# Patient Record
Sex: FEMALE | Race: WHITE | NOT HISPANIC OR LATINO | Employment: UNEMPLOYED | URBAN - NONMETROPOLITAN AREA
[De-identification: names, ages, dates, MRNs, and addresses within clinical notes are randomized per-mention and may not be internally consistent; named-entity substitution may affect disease eponyms.]

---

## 2021-04-30 ENCOUNTER — OFFICE VISIT (OUTPATIENT)
Dept: OTOLARYNGOLOGY | Facility: CLINIC | Age: 21
End: 2021-04-30

## 2021-04-30 VITALS
WEIGHT: 159.7 LBS | BODY MASS INDEX: 33.52 KG/M2 | DIASTOLIC BLOOD PRESSURE: 74 MMHG | SYSTOLIC BLOOD PRESSURE: 112 MMHG | TEMPERATURE: 97.3 F | HEIGHT: 58 IN | HEART RATE: 97 BPM

## 2021-04-30 DIAGNOSIS — H93.19 TINNITUS, UNSPECIFIED LATERALITY: ICD-10-CM

## 2021-04-30 DIAGNOSIS — R42 DISEQUILIBRIUM: ICD-10-CM

## 2021-04-30 DIAGNOSIS — H91.90 HEARING LOSS, UNSPECIFIED HEARING LOSS TYPE, UNSPECIFIED LATERALITY: Primary | ICD-10-CM

## 2021-04-30 PROCEDURE — 99203 OFFICE O/P NEW LOW 30 MIN: CPT | Performed by: OTOLARYNGOLOGY

## 2021-04-30 RX ORDER — BUSPIRONE HYDROCHLORIDE 7.5 MG/1
TABLET ORAL
COMMUNITY
Start: 2021-04-16

## 2021-04-30 RX ORDER — ONDANSETRON 4 MG/1
TABLET, FILM COATED ORAL
COMMUNITY
Start: 2021-04-16

## 2021-04-30 RX ORDER — TIZANIDINE 4 MG/1
TABLET ORAL
COMMUNITY
Start: 2021-04-21

## 2021-04-30 RX ORDER — HYDROXYZINE HYDROCHLORIDE 25 MG/1
TABLET, FILM COATED ORAL
COMMUNITY
Start: 2021-04-16

## 2021-04-30 RX ORDER — TRAZODONE HYDROCHLORIDE 50 MG/1
TABLET ORAL
COMMUNITY
Start: 2021-04-16

## 2021-04-30 NOTE — PROGRESS NOTES
ENT Office Consult Note     Date of Consult: 2021     Patient Name: Lisandra Kennedy  MRN: [id number]   : 2000     Referring Provider: Selena Norris A*    Care Team: Patient Care Team:  Selena Norris APRN as PCP - General (Nurse Practitioner)  Geovanny Nixon MD as PCP - Family Medicine     Chief Complaint:    Chief Complaint   Patient presents with   • Consult     New Patient is here today complaining of hearing problems.        History of Present Illness: Lisandra Kennedy is a 20 y.o. female who presents today for evaluation of ongoing hearing loss that developed in her early teenage years.  She does not recall when her last audiogram was performed.  She has no prior loud noise exposure and has had no recent bouts of acute otitis or otorrhea.  She also has tinnitus in both ears greater in the left which has been present for several years.  She also has a several year history of disequilibrium and states that she randomly loses her balance.  She does not describe any whirling vertigo.    Subjective      Review of Systems:   Review of Systems   HENT: Positive for ear pain and hearing loss.    Neurological: Positive for dizziness.   Psychiatric/Behavioral: The patient is nervous/anxious.       I have reviewed and confirmed the accuracy of the ROS as documented by the MA/LPN/RN Mateusz Mckeon MD     Pertinent items are noted in HPI.     Past Medical History:   Past Medical History:   Diagnosis Date   • Back problem    • Hearing loss    • Muscle spasm        Past Surgical History: History reviewed. No pertinent surgical history.    Family History: History reviewed. No pertinent family history.    Social History:   Social History     Socioeconomic History   • Marital status: Single     Spouse name: Not on file   • Number of children: Not on file   • Years of education: Not on file   • Highest education level: Not on file   Substance and Sexual Activity   • Alcohol use:  "Yes       Medications:     Current Outpatient Medications:   •  busPIRone (BUSPAR) 7.5 MG tablet, , Disp: , Rfl:   •  hydrOXYzine (ATARAX) 25 MG tablet, , Disp: , Rfl:   •  ondansetron (ZOFRAN) 4 MG tablet, , Disp: , Rfl:   •  tiZANidine (ZANAFLEX) 4 MG tablet, , Disp: , Rfl:   •  traZODone (DESYREL) 50 MG tablet, , Disp: , Rfl:     Allergies:   Allergies   Allergen Reactions   • Drug Ingredient [Kiwi Extract] Rash   • Latex Rash   • Pineapple Rash       Objective     Physical Exam:  Vital Signs:   Vitals:    04/30/21 1406   BP: 112/74   Pulse: 97   Temp: 97.3 °F (36.3 °C)   Weight: 72.4 kg (159 lb 11.2 oz)   Height: 147.3 cm (58\")     Body mass index is 33.38 kg/m².     General Appearance:  Alert, cooperative, in no acute distress   Head:  Normocephalic, without obvious abnormality, atraumatic   Eyes:          Conjunctivae and sclerae normal, PERRLA   Ears:   Tympanic membranes both intact and healthy in appearance middle ear spaces dry   Nose:  Relatively straight septum with pink dry mucosa   Throat:  Pink pharyngeal mucosa with size 2 tonsils   Fiberoptic Exam:  Deferred   Neck:  No significant cervical adenopathy or salivary masses   Lungs:   Clear to auscultation,respirations regular, [place] and unlabored      Heart:  Regular rhythm and normal rate, normal S1 and S2, no       murmur, no gallop, no rub, no click   Pulses: Pulses palpable and equal bilaterally   Skin: No bleeding, bruising or rash   Lymph nodes: No palpable adenopathy   Neurologic: Cranial nerves 2 - 12 grossly intact        Results Review:   Labs:     Imaging: No Images in the past 120 days found..      Assessment / Plan      Assessment/Plan:   Diagnoses and all orders for this visit:    1. Hearing loss, unspecified hearing loss type, unspecified laterality (Primary)    2. Disequilibrium    3. Tinnitus, unspecified laterality         Lisandra has a 6-7 year history of progressive sensorineural hearing loss associated with tinnitus which seems to " be worse in the left ear.  She also has a history of disequilibrium but has not had episodes of whirling vertigo.  She does not recall when her last audiogram was performed.  We will need to refer her for a complete audiogram and check of her eustachian tube function.  It certainly sounds like she may have a sensorineural hearing loss.  She may be a candidate for amplification.  Depending on whether her hearing loss is progressing she may need further work-up.      Follow Up:   No follow-ups on file.    Time:    Discussed plan of care in detail with patient today. Patient verbally understands and agrees. I have spent and counseled for approximately 30 minutes face to face, with greater than 50 % of the time counseling.     Mateusz Mckeon MD

## 2023-01-11 ENCOUNTER — APPOINTMENT (OUTPATIENT)
Dept: CT IMAGING | Facility: HOSPITAL | Age: 23
End: 2023-01-11
Payer: COMMERCIAL

## 2023-01-11 ENCOUNTER — HOSPITAL ENCOUNTER (EMERGENCY)
Facility: HOSPITAL | Age: 23
Discharge: HOME | End: 2023-01-11
Attending: EMERGENCY MEDICINE | Admitting: EMERGENCY MEDICINE
Payer: COMMERCIAL

## 2023-01-11 VITALS
HEIGHT: 56 IN | HEART RATE: 60 BPM | SYSTOLIC BLOOD PRESSURE: 118 MMHG | DIASTOLIC BLOOD PRESSURE: 83 MMHG | RESPIRATION RATE: 20 BRPM | TEMPERATURE: 98.2 F | BODY MASS INDEX: 36.67 KG/M2 | WEIGHT: 163 LBS | OXYGEN SATURATION: 96 %

## 2023-01-11 DIAGNOSIS — K52.9 ENTERITIS: Primary | ICD-10-CM

## 2023-01-11 DIAGNOSIS — N30.00 ACUTE CYSTITIS WITHOUT HEMATURIA: ICD-10-CM

## 2023-01-11 LAB
ALBUMIN SERPL-MCNC: 4.4 G/DL (ref 3.5–5.2)
ALBUMIN/GLOB SERPL: 1.6 G/DL
ALP SERPL-CCNC: 143 U/L (ref 39–117)
ALT SERPL W P-5'-P-CCNC: 24 U/L (ref 1–33)
ANION GAP SERPL CALCULATED.3IONS-SCNC: 12.4 MMOL/L (ref 5–15)
AST SERPL-CCNC: 17 U/L (ref 1–32)
B-HCG UR QL: NEGATIVE
BACTERIA UR QL AUTO: ABNORMAL /HPF
BASOPHILS # BLD AUTO: 0.08 10*3/MM3 (ref 0–0.2)
BASOPHILS NFR BLD AUTO: 0.9 % (ref 0–1.5)
BILIRUB SERPL-MCNC: 0.2 MG/DL (ref 0–1.2)
BILIRUB UR QL STRIP: NEGATIVE
BUN SERPL-MCNC: 8 MG/DL (ref 6–20)
BUN/CREAT SERPL: 11 (ref 7–25)
CALCIUM SPEC-SCNC: 10 MG/DL (ref 8.6–10.5)
CHLORIDE SERPL-SCNC: 105 MMOL/L (ref 98–107)
CLARITY UR: CLEAR
CO2 SERPL-SCNC: 23.6 MMOL/L (ref 22–29)
COLOR UR: YELLOW
CREAT SERPL-MCNC: 0.73 MG/DL (ref 0.57–1)
DEPRECATED RDW RBC AUTO: 42.3 FL (ref 37–54)
EGFRCR SERPLBLD CKD-EPI 2021: 119.4 ML/MIN/1.73
EOSINOPHIL # BLD AUTO: 0.62 10*3/MM3 (ref 0–0.4)
EOSINOPHIL NFR BLD AUTO: 6.8 % (ref 0.3–6.2)
ERYTHROCYTE [DISTWIDTH] IN BLOOD BY AUTOMATED COUNT: 12.7 % (ref 12.3–15.4)
GLOBULIN UR ELPH-MCNC: 2.8 GM/DL
GLUCOSE SERPL-MCNC: 97 MG/DL (ref 65–99)
GLUCOSE UR STRIP-MCNC: NEGATIVE MG/DL
HCT VFR BLD AUTO: 46.4 % (ref 34–46.6)
HGB BLD-MCNC: 16.1 G/DL (ref 12–15.9)
HGB UR QL STRIP.AUTO: ABNORMAL
HOLD SPECIMEN: NORMAL
HOLD SPECIMEN: NORMAL
HYALINE CASTS UR QL AUTO: ABNORMAL /LPF
IMM GRANULOCYTES # BLD AUTO: 0.03 10*3/MM3 (ref 0–0.05)
IMM GRANULOCYTES NFR BLD AUTO: 0.3 % (ref 0–0.5)
KETONES UR QL STRIP: NEGATIVE
LEUKOCYTE ESTERASE UR QL STRIP.AUTO: NEGATIVE
LIPASE SERPL-CCNC: 43 U/L (ref 13–60)
LYMPHOCYTES # BLD AUTO: 2.63 10*3/MM3 (ref 0.7–3.1)
LYMPHOCYTES NFR BLD AUTO: 28.7 % (ref 19.6–45.3)
MCH RBC QN AUTO: 31.5 PG (ref 26.6–33)
MCHC RBC AUTO-ENTMCNC: 34.7 G/DL (ref 31.5–35.7)
MCV RBC AUTO: 90.8 FL (ref 79–97)
MONOCYTES # BLD AUTO: 0.61 10*3/MM3 (ref 0.1–0.9)
MONOCYTES NFR BLD AUTO: 6.7 % (ref 5–12)
NEUTROPHILS NFR BLD AUTO: 5.2 10*3/MM3 (ref 1.7–7)
NEUTROPHILS NFR BLD AUTO: 56.6 % (ref 42.7–76)
NITRITE UR QL STRIP: POSITIVE
NRBC BLD AUTO-RTO: 0 /100 WBC (ref 0–0.2)
PH UR STRIP.AUTO: 6 [PH] (ref 5–8)
PLATELET # BLD AUTO: 256 10*3/MM3 (ref 140–450)
PMV BLD AUTO: 10.2 FL (ref 6–12)
POTASSIUM SERPL-SCNC: 4.5 MMOL/L (ref 3.5–5.2)
PROT SERPL-MCNC: 7.2 G/DL (ref 6–8.5)
PROT UR QL STRIP: NEGATIVE
RBC # BLD AUTO: 5.11 10*6/MM3 (ref 3.77–5.28)
RBC # UR STRIP: ABNORMAL /HPF
REF LAB TEST METHOD: ABNORMAL
SODIUM SERPL-SCNC: 141 MMOL/L (ref 136–145)
SP GR UR STRIP: 1.01 (ref 1–1.03)
SQUAMOUS #/AREA URNS HPF: ABNORMAL /HPF
UROBILINOGEN UR QL STRIP: ABNORMAL
WBC # UR STRIP: ABNORMAL /HPF
WBC NRBC COR # BLD: 9.17 10*3/MM3 (ref 3.4–10.8)
WHOLE BLOOD HOLD COAG: NORMAL
WHOLE BLOOD HOLD SPECIMEN: NORMAL

## 2023-01-11 PROCEDURE — 0 IOPAMIDOL PER 1 ML: Performed by: EMERGENCY MEDICINE

## 2023-01-11 PROCEDURE — 85025 COMPLETE CBC W/AUTO DIFF WBC: CPT | Performed by: EMERGENCY MEDICINE

## 2023-01-11 PROCEDURE — 25010000002 MORPHINE PER 10 MG: Performed by: EMERGENCY MEDICINE

## 2023-01-11 PROCEDURE — 96374 THER/PROPH/DIAG INJ IV PUSH: CPT

## 2023-01-11 PROCEDURE — 99283 EMERGENCY DEPT VISIT LOW MDM: CPT

## 2023-01-11 PROCEDURE — 81001 URINALYSIS AUTO W/SCOPE: CPT | Performed by: PHYSICIAN ASSISTANT

## 2023-01-11 PROCEDURE — 96375 TX/PRO/DX INJ NEW DRUG ADDON: CPT

## 2023-01-11 PROCEDURE — 80053 COMPREHEN METABOLIC PANEL: CPT | Performed by: EMERGENCY MEDICINE

## 2023-01-11 PROCEDURE — 81025 URINE PREGNANCY TEST: CPT | Performed by: PHYSICIAN ASSISTANT

## 2023-01-11 PROCEDURE — 83690 ASSAY OF LIPASE: CPT | Performed by: EMERGENCY MEDICINE

## 2023-01-11 PROCEDURE — 25010000002 ONDANSETRON PER 1 MG: Performed by: EMERGENCY MEDICINE

## 2023-01-11 PROCEDURE — 74177 CT ABD & PELVIS W/CONTRAST: CPT

## 2023-01-11 RX ORDER — SODIUM CHLORIDE 0.9 % (FLUSH) 0.9 %
10 SYRINGE (ML) INJECTION AS NEEDED
Status: DISCONTINUED | OUTPATIENT
Start: 2023-01-11 | End: 2023-01-11 | Stop reason: HOSPADM

## 2023-01-11 RX ORDER — CEFUROXIME AXETIL 500 MG/1
500 TABLET ORAL 2 TIMES DAILY
Qty: 10 TABLET | Refills: 0 | Status: SHIPPED | OUTPATIENT
Start: 2023-01-11 | End: 2023-01-16

## 2023-01-11 RX ORDER — ONDANSETRON 2 MG/ML
4 INJECTION INTRAMUSCULAR; INTRAVENOUS ONCE
Status: COMPLETED | OUTPATIENT
Start: 2023-01-11 | End: 2023-01-11

## 2023-01-11 RX ORDER — ONDANSETRON 4 MG/1
4 TABLET, FILM COATED ORAL EVERY 6 HOURS PRN
Qty: 12 TABLET | Refills: 0 | Status: SHIPPED | OUTPATIENT
Start: 2023-01-11

## 2023-01-11 RX ADMIN — MORPHINE SULFATE 4 MG: 4 INJECTION, SOLUTION INTRAMUSCULAR; INTRAVENOUS at 11:04

## 2023-01-11 RX ADMIN — ONDANSETRON 4 MG: 2 INJECTION INTRAMUSCULAR; INTRAVENOUS at 11:02

## 2023-01-11 RX ADMIN — IOPAMIDOL 200 ML: 510 INJECTION, SOLUTION INTRAVASCULAR at 11:46

## 2023-01-11 NOTE — ED PROVIDER NOTES
"Subjective  History of Present Illness:    Chief Complaint: Right lower quadrant abdominal pain  History of Present Illness: 22-year-old female presents with right lower quadrant abdominal pain, its been hurting for the last 2 to 3 days, but extremely bad today.  She also reports that she had some nausea vomiting, she denies any urinary symptoms.  She has not eaten anything today.  Its worse with touching the area she reports or with movement.  No previous symptoms like this in the past, and no previous surgeries, she denies pregnancy  Onset: Gradual onset  Duration: 3 days  Exacerbating / Alleviating factors: Worse with movement and to touch  Associated symptoms: Decreased appetite nausea vomiting      Nurses Notes reviewed and agree, including vitals, allergies, social history and prior medical history.     REVIEW OF SYSTEMS: All systems reviewed and not pertinent unless noted.    Review of Systems   Gastrointestinal: Positive for abdominal pain, nausea and vomiting.   All other systems reviewed and are negative.      Past Medical History:   Diagnosis Date   • Back problem    • Hearing loss    • Muscle spasm        Allergies:    Drug ingredient [kiwi extract], Latex, and Pineapple      Past Surgical History:   Procedure Laterality Date   • EAR TUBES           Social History     Socioeconomic History   • Marital status: Single   Tobacco Use   • Smoking status: Some Days     Packs/day: 1.00     Types: Cigarettes   Vaping Use   • Vaping Use: Every day   • Substances: Nicotine, Flavoring   • Devices: Disposable   Substance and Sexual Activity   • Alcohol use: Yes     Comment: rare   • Drug use: Yes         History reviewed. No pertinent family history.    Objective  Physical Exam:  /83   Pulse 60   Temp 98.2 °F (36.8 °C) (Oral)   Resp 20   Ht 142.2 cm (56\")   Wt 73.9 kg (163 lb)   LMP  (LMP Unknown)   SpO2 96%   BMI 36.54 kg/m²      Physical Exam  Vitals and nursing note reviewed.   Constitutional:       " Appearance: She is well-developed.   Cardiovascular:      Rate and Rhythm: Normal rate and regular rhythm.   Pulmonary:      Effort: Pulmonary effort is normal.      Breath sounds: Normal breath sounds.   Abdominal:      General: Bowel sounds are normal.      Palpations: Abdomen is soft.      Tenderness: There is abdominal tenderness in the right lower quadrant.   Musculoskeletal:         General: Normal range of motion.      Cervical back: Normal range of motion and neck supple.   Skin:     General: Skin is warm and dry.   Neurological:      Mental Status: She is alert and oriented to person, place, and time.      Deep Tendon Reflexes: Reflexes are normal and symmetric.           Procedures    ED Course:    ED Course as of 01/11/23 1250   Wed Jan 11, 2023   1239 Pain relieved, no acute distress [CS]      ED Course User Index  [CS] Kevin Del Rosario Jr., YUVAL       Lab Results (last 24 hours)     Procedure Component Value Units Date/Time    CBC & Differential [[id number]]  (Abnormal) Collected: 01/11/23 1035    Specimen: Blood Updated: 01/11/23 1043    Narrative:      The following orders were created for panel order CBC & Differential.  Procedure                               Abnormality         Status                     ---------                               -----------         ------                     CBC Auto Differential[[id number]]         Abnormal            Final result                 Please view results for these tests on the individual orders.    Comprehensive Metabolic Panel [[id number]]  (Abnormal) Collected: 01/11/23 1035    Specimen: Blood Updated: 01/11/23 1057     Glucose 97 mg/dL      BUN 8 mg/dL      Creatinine 0.73 mg/dL      Sodium 141 mmol/L      Potassium 4.5 mmol/L      Chloride 105 mmol/L      CO2 23.6 mmol/L      Calcium 10.0 mg/dL      Total Protein 7.2 g/dL      Albumin 4.4 g/dL      ALT (SGPT) 24 U/L      AST (SGOT) 17 U/L      Alkaline Phosphatase 143 U/L      Total Bilirubin 0.2 mg/dL       Globulin 2.8 gm/dL      A/G Ratio 1.6 g/dL      BUN/Creatinine Ratio 11.0     Anion Gap 12.4 mmol/L      eGFR 119.4 mL/min/1.73      Comment: National Kidney Foundation and American Society of Nephrology (ASN) Task Force recommended calculation based on the Chronic Kidney Disease Epidemiology Collaboration (CKD-EPI) equation refit without adjustment for race.       Narrative:      GFR Normal >60  Chronic Kidney Disease <60  Kidney Failure <15      Lipase [[id number]]  (Normal) Collected: 01/11/23 1035    Specimen: Blood Updated: 01/11/23 1057     Lipase 43 U/L     CBC Auto Differential [[id number]]  (Abnormal) Collected: 01/11/23 1035    Specimen: Blood Updated: 01/11/23 1043     WBC 9.17 10*3/mm3      RBC 5.11 10*6/mm3      Hemoglobin 16.1 g/dL      Hematocrit 46.4 %      MCV 90.8 fL      MCH 31.5 pg      MCHC 34.7 g/dL      RDW 12.7 %      RDW-SD 42.3 fl      MPV 10.2 fL      Platelets 256 10*3/mm3      Neutrophil % 56.6 %      Lymphocyte % 28.7 %      Monocyte % 6.7 %      Eosinophil % 6.8 %      Basophil % 0.9 %      Immature Grans % 0.3 %      Neutrophils, Absolute 5.20 10*3/mm3      Lymphocytes, Absolute 2.63 10*3/mm3      Monocytes, Absolute 0.61 10*3/mm3      Eosinophils, Absolute 0.62 10*3/mm3      Basophils, Absolute 0.08 10*3/mm3      Immature Grans, Absolute 0.03 10*3/mm3      nRBC 0.0 /100 WBC     Urinalysis With Culture If Indicated - Urine, Clean Catch [[id number]]  (Abnormal) Collected: 01/11/23 1125    Specimen: Urine, Clean Catch Updated: 01/11/23 1136     Color, UA Yellow     Appearance, UA Clear     pH, UA 6.0     Specific Gravity, UA 1.014     Glucose, UA Negative     Ketones, UA Negative     Bilirubin, UA Negative     Blood, UA Trace     Protein, UA Negative     Leuk Esterase, UA Negative     Nitrite, UA Positive     Urobilinogen, UA 0.2 E.U./dL    Narrative:      In absence of clinical symptoms, the presence of pyuria, bacteria, and/or nitrites on the urinalysis result does not correlate with  infection.    Urinalysis, Microscopic Only - Urine, Clean Catch [[id number]]  (Abnormal) Collected: 01/11/23 1125    Specimen: Urine, Clean Catch Updated: 01/11/23 1146     RBC, UA None Seen /HPF      WBC, UA 0-2 /HPF      Comment: Urine culture not indicated.        Bacteria, UA 4+ /HPF      Squamous Epithelial Cells, UA 0-2 /HPF      Hyaline Casts, UA None Seen /LPF      Methodology Manual Light Microscopy    Pregnancy, Urine - Urine, Clean Catch [[id number]]  (Normal) Collected: 01/11/23 1129    Specimen: Urine, Clean Catch Updated: 01/11/23 1137     HCG, Urine QL Negative           CT Abdomen Pelvis With Contrast    Result Date: 1/11/2023  PROCEDURE: CT ABDOMEN PELVIS W CONTRAST-  HISTORY:  rlq pain  COMPARISON:  None .  TECHNIQUE: Multiple axial CT images were obtained from the lung bases through the pubic symphysis following the administration of Isovue 300 and oral contrast.  FINDINGS:  ABDOMEN: The lung bases are clear. The heart is normal in size. The liver is normal. There is a gallstone in the gallbladder. There is no gallbladder wall thickening or pericholecystic fluid. There is no infiltration of the surrounding fat. The spleen is unremarkable. No adrenal mass is present.  The pancreas is normal. The kidneys are normal. There is no nephrolithiasis or hydronephrosis. The aorta is normal in caliber. There is no free fluid or adenopathy.  PELVIS: The appendix is normal. The proximal half of the colon is fluid-filled with air-fluid levels suggestive of enteritis. There is no definite wall thickening. The urinary bladder is unremarkable. There is no significant free fluid or adenopathy.      Impression: 1. Cholelithiasis without evidence of acute cholecystitis. 2. Findings suggestive of enteritis.  622.22 mGy.cm   This study was performed with techniques to keep radiation doses as low as reasonably achievable (ALARA). Individualized dose reduction techniques using automated exposure control or adjustment of mA  and/or kV according to the patient size were employed.      Images were reviewed, interpreted, and dictated by Dr. Jennie Sesay MD Transcribed by Sandra Phillip PA-C.  This report was signed and finalized on 1/11/2023 12:19 PM by Jennie Sesay MD.         Medical Decision Making  22-year-old female presented with generalized abdominal pain, with some localized pain in the right lower quadrant Differential diagnosis would include early appendicitis, acute appendicitis, colitis, diverticulitis, irritable bowel disease, irritable syndrome, nephrolithiasis, pyelonephritis.  Evaluate the patient at the bedside, labs were drawn, and a CT scan was performed.  Findings were consistent with enteritis, cholelithiasis, and she was found to have UTI.  Upon reevaluation the patient's pain was controlled after morphine and Zofran, she also received a liter of fluid.  Discussed all results with the patient and family at the bedside, recommended a clear diet advance as tolerated, antibiotics for the UTI, and follow-up with surgery as needed for the cholelithiasis.    Acute cystitis without hematuria: acute illness or injury  Enteritis: acute illness or injury  Amount and/or Complexity of Data Reviewed  Labs: ordered.     Details: Discussed all labs and interpreted myself with the patient at the bedside  Radiology: ordered.      Risk  Prescription drug management.            Final diagnoses:   Enteritis   Acute cystitis without hematuria        Kevin Del Rosario Jr., PA-C  01/11/23 3376

## 2024-02-29 ENCOUNTER — TRANSCRIBE ORDERS (OUTPATIENT)
Dept: GENERAL RADIOLOGY | Facility: HOSPITAL | Age: 24
End: 2024-02-29
Payer: COMMERCIAL

## 2024-02-29 ENCOUNTER — HOSPITAL ENCOUNTER (OUTPATIENT)
Dept: GENERAL RADIOLOGY | Facility: HOSPITAL | Age: 24
Discharge: HOME | End: 2024-02-29
Admitting: FAMILY MEDICINE
Payer: COMMERCIAL

## 2024-02-29 DIAGNOSIS — R06.00 DYSPNEA, UNSPECIFIED TYPE: Primary | ICD-10-CM

## 2024-02-29 DIAGNOSIS — R06.00 DYSPNEA, UNSPECIFIED TYPE: ICD-10-CM

## 2024-02-29 PROCEDURE — 71046 X-RAY EXAM CHEST 2 VIEWS: CPT
